# Patient Record
Sex: FEMALE | Race: WHITE | NOT HISPANIC OR LATINO | Employment: FULL TIME | URBAN - METROPOLITAN AREA
[De-identification: names, ages, dates, MRNs, and addresses within clinical notes are randomized per-mention and may not be internally consistent; named-entity substitution may affect disease eponyms.]

---

## 2019-05-30 ENCOUNTER — HOSPITAL ENCOUNTER (OUTPATIENT)
Dept: ONCOLOGY | Facility: CLINIC | Age: 54
Setting detail: INFUSION SERIES
Discharge: HOME | End: 2019-05-30
Attending: INTERNAL MEDICINE | Admitting: INTERNAL MEDICINE

## 2019-07-08 ENCOUNTER — TELEPHONE (OUTPATIENT)
Dept: ONCOLOGY | Facility: CLINIC | Age: 54
End: 2019-07-08

## 2019-07-08 NOTE — TELEPHONE ENCOUNTER
Returned patient's call regarding needing Dr. Singh's first name in order to have mammogram results forwarded.  She thought results would be available by Wednesday.

## 2019-07-10 NOTE — PROGRESS NOTES
Genetic Note    Adwoa Stacy  1965    Primary Care Physician: Anabella Singh MD  Referring Physician: Anabella Singh,*  Reason For Visit: High Risk Evaluation For breast cancer    Chief Complaint   Patient presents with   • Follow-up     Strong family history of multiple relatives with breast cancer    • Follow-up     Genetic testing results       HPI   This is a 54-year-old female who does not have any history of breast cancer, but who is here today due to strong family history of breast cancer mostly on the maternal side of the family.  Patient’s mother developed breast cancer.  She also has three sisters who also developed breast cancer.  None of them have had genetic testing.  Patient is interested in pursuing cancer genetics for her own risk assessment and management.  She has never had any breast biopsies.  She performs monthly breast self exams.  She denies any lumps, nipple discharge, skin discoloration.  She is scheduled for a mammogram in the next couple of weeks.   · 2019 patient had comprehensive gene analysis with cancer next technology.  A total of 67 genes were analyzed.  All negative for any significant mutations.  · 2019 patient had bilateral mammogram which showed heterogeneously dense breast tissue the lobe was recommended in 1 year    Past Medical History:   Diagnosis Date   • Acid reflux disease    • Constipation    • Family history of breast cancer    • History of multiple allergies    • Hypercholesterolemia    • Osteoarthritis    • Post-menopausal        Past Surgical History:   Procedure Laterality Date   • CARPAL TUNNEL RELEASE Bilateral    •  SECTION      x2   • CYSTOSCOPY W/ LASER LITHOTRIPSY           Current Outpatient Medications:   •  gabapentin (NEURONTIN) 300 MG capsule, Take 300 mg by mouth Daily., Disp: , Rfl:   •  loratadine-pseudoephedrine (CLARITIN-D 24-hour)  MG per 24 hr tablet, TAKE 1 TABLET BY MOUTH EVERY DAY, Disp: , Rfl:   •   "meloxicam (MOBIC) 15 MG tablet, Take 15 mg by mouth., Disp: , Rfl:   •  omeprazole (priLOSEC) 20 MG capsule, Take 20 mg by mouth Daily., Disp: , Rfl:   •  rosuvastatin (CRESTOR) 10 MG tablet, Take 10 mg by mouth Daily., Disp: , Rfl:   •  Calcium Carbonate-Vitamin D 500-50 MG-UNIT capsule, Take 1 tablet by mouth Daily., Disp: , Rfl:   •  Cholecalciferol (VITAMIN D) 1000 units tablet, Take 2,000 Units by mouth Daily., Disp: , Rfl:     Allergies   Allergen Reactions   • Sulfa Antibiotics Hives and Rash     CHEST PAIN, \"STABBING PAIN ALL THE WAY THROUGH MY BODY\"   • Penicillins Hives   • Trimethoprim Hallucinations     EYE SWELLED       Family History   Problem Relation Age of Onset   • Breast cancer Mother 60   • Breast cancer Maternal Aunt         ages 42, 50, and 60-3 maternal aunts   • Ovarian cancer Paternal Aunt 60   • Prostate cancer Maternal Grandfather         mid 80's       Cancer-related family history includes Breast cancer in her maternal aunt; Breast cancer (age of onset: 60) in her mother; Ovarian cancer (age of onset: 60) in her paternal aunt; Prostate cancer in her maternal grandfather.    Social History     Tobacco Use   • Smoking status: Never Smoker   Substance Use Topics   • Alcohol use: Yes     Comment: drinks alcohol occasionally    • Drug use: No       Review of Systems   Constitutional: Negative for chills and fever.   HENT: Negative for ear pain, mouth sores, nosebleeds and sore throat.    Eyes: Negative for photophobia and visual disturbance.   Respiratory: Negative for wheezing and stridor.    Cardiovascular: Negative for chest pain and palpitations.   Gastrointestinal: Negative for abdominal pain, diarrhea, nausea and vomiting.   Endocrine: Negative for cold intolerance and heat intolerance.   Genitourinary: Negative for dysuria and hematuria.   Musculoskeletal: Negative for joint swelling and neck stiffness.   Skin: Negative for color change and rash.   Neurological: Negative for seizures " "and syncope.   Hematological: Negative for adenopathy.        No obvious bleeding   Psychiatric/Behavioral: Negative for agitation, confusion and hallucinations.       Objective:    Vitals:    19 1218   BP: 124/78   Pulse: 73   Resp: 14   Temp: 97.8 °F (36.6 °C)   Weight: 70.3 kg (155 lb)   Height: 154.9 cm (61\")   PainSc: 0-No pain       (0) Fully active, able to carry on all predisease performance without restriction    Physical Exam   Constitutional: She is oriented to person, place, and time. No distress.   HENT:   Head: Normocephalic and atraumatic.   Eyes: Conjunctivae and EOM are normal. Right eye exhibits no discharge. Left eye exhibits no discharge. No scleral icterus.   Neck: Normal range of motion. Neck supple. No thyromegaly present.   Cardiovascular: Normal rate, regular rhythm and normal heart sounds. Exam reveals no gallop and no friction rub.   Pulmonary/Chest: Effort normal. No stridor. No respiratory distress. She has no wheezes.   Abdominal: Soft. Bowel sounds are normal. She exhibits no mass. There is no tenderness. There is no rebound and no guarding.   Musculoskeletal: Normal range of motion. She exhibits no tenderness.   Lymphadenopathy:     She has no cervical adenopathy.   Neurological: She is alert and oriented to person, place, and time. She exhibits normal muscle tone.   Skin: Skin is warm. No rash noted. She is not diaphoretic. No erythema.   Psychiatric: She has a normal mood and affect. Her behavior is normal.   Nursing note and vitals reviewed.      Assessment/Plan     There are no diagnoses linked to this encounter.    ASSESSMENT:    1. Strong family history of multiple relatives with breast cancer on the maternal side of the family, including her mother and three maternal aunts, one premenopausal.   2. Paternal aunt also developed ovarian cancer at the age of 60 and  from metastatic disease.  No other known cancer history on the paternal side of the " family.  3. Comprehensive gene analysis was negative for any significant mutation.  A total of 67 genes were analyzed all negative mutation.  4. Dense breast tissue  5. Tyrer Thayer risk assessment estimated at 22.5% lifetime risk for developing breast cancer... elevated    PLAN:     Patient has screened negative for any significant mutation with cancer next technology.  He understands her family members that have had cancer the best candidates to screen as the results will help to give better interpretation of her negative results.  We have reviewed patient's results.  She has screened negative for any deleterious mutation that could increase her risk for developing cancer.  I explained to patient that the cancers that occurred in her family may have all been sporadic or could still be due to a mutation that we are not able to detect with the available technology.  There are other genes suspected to increase risk breast cancer that are yet to be identified.  About 5% to 10% of all breast cancers are due to genetic mutation, the rest are due to hormonal, reproductive, endocrinology and lifestyle issues.  Patient will continue to monitor her family cancer history and update us of any changes that occur in the future.  Her negative result could imply that even if there is a mutation in the family she may not have inherited it. We discussed the concept of familial breast cancer syndrome, which could play a role in her family cancer development.     Patient has screened to be high risk for breast cancer based on the Tyrer-Cuzick risk assessment tool or the Meryl Model.    We discussed general breast health care such as increased breast awareness, monthly breast self- exams, six monthly clinical breast exam and annual mammograms.  We also discussed use of breast MRI for screening high-risk patients.    We discussed risk modifications such as limiting alcohol ingestion to one to two drinks per week, avoidance of smoking  and avoidance of postmenopausal weight gain.  We discussed breast cancer risks associated with prolonged hormone replacement therapy.    We discussed signs and symptoms for patient to watch out for and notify us should they occur including breast lumps, nipple discharge, skin discoloration, breast pain or any other concerns she may have     We discussed chemoprophylaxis with antiestrogen, which has been shown in clinical studies to reduce risk of breast cancer as much as 50% in high risk women.  We discussed the side effects associated with these medicines including hot flashes, mood changes, and cardiovascular risk including strokes, heart attacks and vascular thrombosis.  Educational materials have been given to patient to review and  she will let me know how to proceed. She may be interested in [employer].She has material to review.    A copy of her genetic results have been given to patient for her own records keeping.  I will plan to see her again in six months for her breast exam and scheduling of her breast MRI .  Patient has been given opportunities to ask questions, which have all been answered to the best of abilities.      More than 40 minutes was spent in discussing her genetic results, counseling and calculating her risk for developing cancer and advising on how to reduce her risks including behavioral modifications. Also discussed future surveillance given her risks.

## 2019-07-11 ENCOUNTER — APPOINTMENT (OUTPATIENT)
Dept: LAB | Facility: HOSPITAL | Age: 54
End: 2019-07-11

## 2019-07-11 ENCOUNTER — OFFICE VISIT (OUTPATIENT)
Dept: ONCOLOGY | Facility: CLINIC | Age: 54
End: 2019-07-11

## 2019-07-11 VITALS
BODY MASS INDEX: 29.27 KG/M2 | HEIGHT: 61 IN | TEMPERATURE: 97.8 F | DIASTOLIC BLOOD PRESSURE: 78 MMHG | HEART RATE: 73 BPM | RESPIRATION RATE: 14 BRPM | WEIGHT: 155 LBS | SYSTOLIC BLOOD PRESSURE: 124 MMHG

## 2019-07-11 DIAGNOSIS — Z80.9 FAMILY HISTORY OF CANCER: Primary | ICD-10-CM

## 2019-07-11 DIAGNOSIS — Z91.89 AT HIGH RISK FOR BREAST CANCER: ICD-10-CM

## 2019-07-11 PROCEDURE — 99215 OFFICE O/P EST HI 40 MIN: CPT | Performed by: INTERNAL MEDICINE

## 2019-07-11 PROCEDURE — G0463 HOSPITAL OUTPT CLINIC VISIT: HCPCS | Performed by: INTERNAL MEDICINE

## 2019-07-11 RX ORDER — MELOXICAM 15 MG/1
15 TABLET ORAL
COMMUNITY
Start: 2018-09-06 | End: 2019-09-06

## 2019-07-11 RX ORDER — ROSUVASTATIN CALCIUM 10 MG/1
10 TABLET, COATED ORAL DAILY
COMMUNITY
Start: 2013-12-20 | End: 2019-08-01

## 2019-07-11 RX ORDER — GABAPENTIN 300 MG/1
300 CAPSULE ORAL DAILY
COMMUNITY
Start: 2013-12-20

## 2019-07-11 RX ORDER — OMEPRAZOLE 20 MG/1
20 CAPSULE, DELAYED RELEASE ORAL DAILY
COMMUNITY
Start: 2019-06-17 | End: 2020-06-11

## 2019-12-17 ENCOUNTER — TELEPHONE (OUTPATIENT)
Dept: ONCOLOGY | Facility: CLINIC | Age: 54
End: 2019-12-17

## 2019-12-17 NOTE — TELEPHONE ENCOUNTER
Ms. Stacy left message she needed to reschedule her 1/2/20 appt.  Returned call, rescheduled for 1/20/20 per pt request.

## 2020-01-20 ENCOUNTER — OFFICE VISIT (OUTPATIENT)
Dept: ONCOLOGY | Facility: CLINIC | Age: 55
End: 2020-01-20

## 2020-01-20 ENCOUNTER — OFFICE VISIT (OUTPATIENT)
Dept: LAB | Facility: HOSPITAL | Age: 55
End: 2020-01-20

## 2020-01-20 VITALS
BODY MASS INDEX: 28.85 KG/M2 | SYSTOLIC BLOOD PRESSURE: 124 MMHG | RESPIRATION RATE: 18 BRPM | TEMPERATURE: 98.4 F | WEIGHT: 152.8 LBS | DIASTOLIC BLOOD PRESSURE: 85 MMHG | HEART RATE: 91 BPM | HEIGHT: 61 IN

## 2020-01-20 DIAGNOSIS — Z91.89 AT HIGH RISK FOR BREAST CANCER: ICD-10-CM

## 2020-01-20 DIAGNOSIS — Z80.9 FAMILY HISTORY OF CANCER: Primary | ICD-10-CM

## 2020-01-20 DIAGNOSIS — Z91.89 AT HIGH RISK FOR BREAST CANCER: Primary | ICD-10-CM

## 2020-01-20 DIAGNOSIS — Z80.9 FAMILY HISTORY OF CANCER: ICD-10-CM

## 2020-01-20 LAB
ALBUMIN SERPL-MCNC: 4.5 G/DL (ref 3.5–5.2)
ALBUMIN/GLOB SERPL: 1.5 G/DL
ALP SERPL-CCNC: 82 U/L (ref 39–117)
ALT SERPL W P-5'-P-CCNC: 13 U/L (ref 1–33)
ANION GAP SERPL CALCULATED.3IONS-SCNC: 12 MMOL/L (ref 5–15)
AST SERPL-CCNC: 21 U/L (ref 1–32)
BASOPHILS # BLD AUTO: 0.06 10*3/MM3 (ref 0–0.2)
BASOPHILS NFR BLD AUTO: 1 % (ref 0–1.5)
BILIRUB SERPL-MCNC: 0.2 MG/DL (ref 0.2–1.2)
BUN BLD-MCNC: 12 MG/DL (ref 6–20)
BUN/CREAT SERPL: 15 (ref 7–25)
CALCIUM SPEC-SCNC: 9.5 MG/DL (ref 8.6–10.5)
CHLORIDE SERPL-SCNC: 103 MMOL/L (ref 98–107)
CO2 SERPL-SCNC: 27 MMOL/L (ref 22–29)
CREAT BLD-MCNC: 0.8 MG/DL (ref 0.57–1)
DEPRECATED RDW RBC AUTO: 41.9 FL (ref 37–54)
EOSINOPHIL # BLD AUTO: 0.23 10*3/MM3 (ref 0–0.4)
EOSINOPHIL NFR BLD AUTO: 3.7 % (ref 0.3–6.2)
ERYTHROCYTE [DISTWIDTH] IN BLOOD BY AUTOMATED COUNT: 13.1 % (ref 12.3–15.4)
GFR SERPL CREATININE-BSD FRML MDRD: 74 ML/MIN/1.73
GLOBULIN UR ELPH-MCNC: 3.1 GM/DL
GLUCOSE BLD-MCNC: 104 MG/DL (ref 65–99)
HCT VFR BLD AUTO: 39.1 % (ref 34–46.6)
HGB BLD-MCNC: 13.3 G/DL (ref 12–15.9)
LYMPHOCYTES # BLD AUTO: 1.92 10*3/MM3 (ref 0.7–3.1)
LYMPHOCYTES NFR BLD AUTO: 31.2 % (ref 19.6–45.3)
MCH RBC QN AUTO: 30.4 PG (ref 26.6–33)
MCHC RBC AUTO-ENTMCNC: 34 G/DL (ref 31.5–35.7)
MCV RBC AUTO: 89.5 FL (ref 79–97)
MONOCYTES # BLD AUTO: 0.46 10*3/MM3 (ref 0.1–0.9)
MONOCYTES NFR BLD AUTO: 7.5 % (ref 5–12)
NEUTROPHILS # BLD AUTO: 3.48 10*3/MM3 (ref 1.7–7)
NEUTROPHILS NFR BLD AUTO: 56.6 % (ref 42.7–76)
PLATELET # BLD AUTO: 315 10*3/MM3 (ref 140–450)
PMV BLD AUTO: 8.9 FL (ref 6–12)
POTASSIUM BLD-SCNC: 3.8 MMOL/L (ref 3.5–5.2)
PROT SERPL-MCNC: 7.6 G/DL (ref 6–8.5)
RBC # BLD AUTO: 4.37 10*6/MM3 (ref 3.77–5.28)
SODIUM BLD-SCNC: 142 MMOL/L (ref 136–145)
WBC NRBC COR # BLD: 6.15 10*3/MM3 (ref 3.4–10.8)

## 2020-01-20 PROCEDURE — 85025 COMPLETE CBC W/AUTO DIFF WBC: CPT

## 2020-01-20 PROCEDURE — 36415 COLL VENOUS BLD VENIPUNCTURE: CPT

## 2020-01-20 PROCEDURE — 99214 OFFICE O/P EST MOD 30 MIN: CPT | Performed by: INTERNAL MEDICINE

## 2020-01-20 PROCEDURE — 80053 COMPREHEN METABOLIC PANEL: CPT | Performed by: NURSE PRACTITIONER

## 2020-01-20 RX ORDER — PLECANATIDE 3 MG/1
TABLET ORAL
COMMUNITY
Start: 2019-12-13

## 2020-01-20 RX ORDER — MELOXICAM 15 MG/1
15 TABLET ORAL
COMMUNITY
Start: 2019-07-11 | End: 2020-07-10

## 2020-01-20 RX ORDER — RALOXIFENE HYDROCHLORIDE 60 MG/1
60 TABLET, FILM COATED ORAL DAILY
Refills: 12 | COMMUNITY
Start: 2019-12-13 | End: 2021-09-30 | Stop reason: SDUPTHER

## 2020-01-20 RX ORDER — ROSUVASTATIN CALCIUM 10 MG/1
10 TABLET, COATED ORAL DAILY
Refills: 3 | COMMUNITY
Start: 2019-10-21

## 2020-01-20 NOTE — PROGRESS NOTES
Genetic Note    Adwoa Stacy  1965    Primary Care Physician: Anabella Singh MD  Referring Physician: Provider, No Known  Reason For Visit: High Risk Evaluation For breast cancer    No chief complaint on file.      HPI   This is a 54-year-old female who does not have any history of breast cancer, but who is here today due to strong family history of breast cancer mostly on the maternal side of the family.  Patient’s mother developed breast cancer.  She also has three sisters who also developed breast cancer.  None of them have had genetic testing.  Patient is interested in pursuing cancer genetics for her own risk assessment and management.  She has never had any breast biopsies.  She performs monthly breast self exams.  She denies any lumps, nipple discharge, skin discoloration.  She is scheduled for a mammogram in the next couple of weeks.   · 2019 patient had comprehensive gene analysis with cancer next technology.  A total of 67 genes were analyzed.  All negative for any significant mutations.  · 2019 patient had bilateral mammogram which showed heterogeneously dense breast tissue the lobe was recommended in 1 year    Past Medical History:   Diagnosis Date   • Acid reflux disease    • Constipation    • Family history of breast cancer    • History of multiple allergies    • Hypercholesterolemia    • Osteoarthritis    • Post-menopausal        Past Surgical History:   Procedure Laterality Date   • CARPAL TUNNEL RELEASE Bilateral    •  SECTION      x2   • CYSTOSCOPY W/ LASER LITHOTRIPSY           Current Outpatient Medications:   •  Calcium Carbonate-Vitamin D 500-50 MG-UNIT capsule, Take 1 tablet by mouth Daily., Disp: , Rfl:   •  Cholecalciferol (VITAMIN D) 1000 units tablet, Take 2,000 Units by mouth Daily., Disp: , Rfl:   •  gabapentin (NEURONTIN) 300 MG capsule, Take 300 mg by mouth Daily., Disp: , Rfl:   •  loratadine-pseudoephedrine (CLARITIN-D 24-hour)  MG per 24 hr  "tablet, TAKE 1 TABLET BY MOUTH EVERY DAY, Disp: , Rfl:   •  omeprazole (priLOSEC) 20 MG capsule, Take 20 mg by mouth Daily., Disp: , Rfl:     Allergies   Allergen Reactions   • Sulfa Antibiotics Hives and Rash     CHEST PAIN, \"STABBING PAIN ALL THE WAY THROUGH MY BODY\"   • Penicillins Hives   • Trimethoprim Hallucinations     EYE SWELLED       Family History   Problem Relation Age of Onset   • Breast cancer Mother 60   • Breast cancer Maternal Aunt         ages 42, 50, and 60-3 maternal aunts   • Ovarian cancer Paternal Aunt 60   • Prostate cancer Maternal Grandfather         mid 80's       Cancer-related family history includes Breast cancer in her maternal aunt; Breast cancer (age of onset: 60) in her mother; Ovarian cancer (age of onset: 60) in her paternal aunt; Prostate cancer in her maternal grandfather.    Social History     Tobacco Use   • Smoking status: Never Smoker   Substance Use Topics   • Alcohol use: Yes     Comment: drinks alcohol occasionally    • Drug use: No       Review of Systems   Constitutional: Negative for chills and fever.   HENT: Negative for ear pain, mouth sores, nosebleeds and sore throat.    Eyes: Negative for photophobia and visual disturbance.   Respiratory: Negative for wheezing and stridor.    Cardiovascular: Negative for chest pain and palpitations.   Gastrointestinal: Negative for abdominal pain, diarrhea, nausea and vomiting.   Endocrine: Negative for cold intolerance and heat intolerance.   Genitourinary: Negative for dysuria and hematuria.   Musculoskeletal: Negative for joint swelling and neck stiffness.   Skin: Negative for color change and rash.   Neurological: Negative for seizures and syncope.   Hematological: Negative for adenopathy.        No obvious bleeding   Psychiatric/Behavioral: Negative for agitation, confusion and hallucinations.       Objective:    There were no vitals filed for this visit.    (0) Fully active, able to carry on all predisease performance " without restriction    Physical Exam   Constitutional: She is oriented to person, place, and time. No distress.   HENT:   Head: Normocephalic and atraumatic.   Eyes: Conjunctivae and EOM are normal. Right eye exhibits no discharge. Left eye exhibits no discharge. No scleral icterus.   Neck: Normal range of motion. Neck supple. No thyromegaly present.   Cardiovascular: Normal rate, regular rhythm and normal heart sounds. Exam reveals no gallop and no friction rub.   Pulmonary/Chest: Effort normal. No stridor. No respiratory distress. She has no wheezes.   Abdominal: Soft. Bowel sounds are normal. She exhibits no mass. There is no tenderness. There is no rebound and no guarding.   Musculoskeletal: Normal range of motion. She exhibits no tenderness.   Lymphadenopathy:     She has no cervical adenopathy.   Neurological: She is alert and oriented to person, place, and time. She exhibits normal muscle tone.   Skin: Skin is warm. No rash noted. She is not diaphoretic. No erythema.   Psychiatric: She has a normal mood and affect. Her behavior is normal.   Nursing note and vitals reviewed.      Assessment/Plan     There are no diagnoses linked to this encounter.    ASSESSMENT:    1. Strong family history of multiple relatives with breast cancer on the maternal side of the family, including her mother and three maternal aunts, one premenopausal.   2. Paternal aunt also developed ovarian cancer at the age of 60 and  from metastatic disease.  No other known cancer history on the paternal side of the family.  3. Comprehensive gene analysis was negative for any significant mutation.  A total of 67 genes were analyzed all negative mutation.  4. Dense breast tissue  5. Tyrer Verónica risk assessment estimated at 22.5% lifetime risk for developing breast cancer... elevated    PLAN:     Patient has screened negative for any significant mutation with cancer next technology.  He understands her family members that have had cancer the  best candidates to screen as the results will help to give better interpretation of her negative results.  We have reviewed patient's results.  She has screened negative for any deleterious mutation that could increase her risk for developing cancer.  I explained to patient that the cancers that occurred in her family may have all been sporadic or could still be due to a mutation that we are not able to detect with the available technology.  There are other genes suspected to increase risk breast cancer that are yet to be identified.  About 5% to 10% of all breast cancers are due to genetic mutation, the rest are due to hormonal, reproductive, endocrinology and lifestyle issues.  Patient will continue to monitor her family cancer history and update us of any changes that occur in the future.  Her negative result could imply that even if there is a mutation in the family she may not have inherited it. We discussed the concept of familial breast cancer syndrome, which could play a role in her family cancer development.     Patient has screened to be high risk for breast cancer based on the Tyrer-Cuzick risk assessment tool or the Meryl Model.    We discussed general breast health care such as increased breast awareness, monthly breast self- exams, six monthly clinical breast exam and annual mammograms.  We also discussed use of breast MRI for screening high-risk patients.    We discussed risk modifications such as limiting alcohol ingestion to one to two drinks per week, avoidance of smoking and avoidance of postmenopausal weight gain.  We discussed breast cancer risks associated with prolonged hormone replacement therapy.    We discussed signs and symptoms for patient to watch out for and notify us should they occur including breast lumps, nipple discharge, skin discoloration, breast pain or any other concerns she may have     We discussed chemoprophylaxis with antiestrogen, which has been shown in clinical studies to  reduce risk of breast cancer as much as 50% in high risk women.  We discussed the side effects associated with these medicines including hot flashes, mood changes, and cardiovascular risk including strokes, heart attacks and vascular thrombosis.  Educational materials have been given to patient to review and  she will let me know how to proceed. She may be interested in [employer].She has material to review.    A copy of her genetic results have been given to patient for her own records keeping.  I will plan to see her again in six months for her breast exam and scheduling of her breast MRI .  Patient has been given opportunities to ask questions, which have all been answered to the best of abilities.      More than 40 minutes was spent in discussing her genetic results, counseling and calculating her risk for developing cancer and advising on how to reduce her risks including behavioral modifications. Also discussed future surveillance given her risks.

## 2020-01-20 NOTE — PROGRESS NOTES
Hematology/Oncology Outpatient Follow Up    PATIENT NAME:Adwoa Stacy  :1965  MRN: [id number]  PRIMARY CARE PHYSICIAN: Anabella Singh MD  REFERRING PHYSICIAN: Provider, No Known    Chief Complaint   Patient presents with   • Follow-up     family history of cabncer        HISTORY OF PRESENT ILLNESS:   This is a 54-year-old female who does not have any history of breast cancer, but who is here today due to strong family history of breast cancer mostly on the maternal side of the family.  Patient’s mother developed breast cancer.  She also has three sisters who also developed breast cancer.  None of them have had genetic testing.  Patient is interested in pursuing cancer genetics for her own risk assessment and management.  She has never had any breast biopsies.  She performs monthly breast self exams.  She denies any lumps, nipple discharge, skin discoloration.  She is scheduled for a mammogram in the next couple of weeks.   · 2019 patient had comprehensive gene analysis with cancer next technology.  A total of 67 genes were analyzed.  All negative for any significant mutations.  · 2019 patient had bilateral mammogram which showed heterogeneously dense breast tissue the lobe was recommended in 1 year  · Patient was placed on Evista for osteopenia nd breats cancer prevention    Past Medical History:   Diagnosis Date   • Acid reflux disease    • Constipation    • Family history of breast cancer    • History of multiple allergies    • Hypercholesterolemia    • Osteoarthritis    • Post-menopausal        Past Surgical History:   Procedure Laterality Date   • CARPAL TUNNEL RELEASE Bilateral    •  SECTION      x2   • CYSTOSCOPY W/ LASER LITHOTRIPSY           Current Outpatient Medications:   •  Calcium Carbonate-Vitamin D 500-50 MG-UNIT capsule, Take 1 tablet by mouth Daily., Disp: , Rfl:   •  Cholecalciferol (VITAMIN D) 1000 units tablet, Take 2,000 Units by mouth Daily., Disp: , Rfl:   •   "gabapentin (NEURONTIN) 300 MG capsule, Take 300 mg by mouth Daily., Disp: , Rfl:   •  loratadine-pseudoephedrine (CLARITIN-D 24-hour)  MG per 24 hr tablet, TAKE 1 TABLET BY MOUTH EVERY DAY, Disp: , Rfl:   •  meloxicam (MOBIC) 15 MG tablet, Take 15 mg by mouth., Disp: , Rfl:   •  omeprazole (priLOSEC) 20 MG capsule, Take 20 mg by mouth Daily., Disp: , Rfl:   •  raloxifene (EVISTA) 60 MG tablet, Take 60 mg by mouth Daily., Disp: , Rfl: 12  •  rosuvastatin (CRESTOR) 10 MG tablet, Take 10 mg by mouth Daily., Disp: , Rfl: 3  •  TRULANCE 3 MG tablet, , Disp: , Rfl:     Allergies   Allergen Reactions   • Sulfa Antibiotics Hives and Rash     CHEST PAIN, \"STABBING PAIN ALL THE WAY THROUGH MY BODY\"   • Penicillins Hives   • Trimethoprim Hallucinations     EYE SWELLED       Family History   Problem Relation Age of Onset   • Breast cancer Mother 60   • Breast cancer Maternal Aunt         ages 42, 50, and 60-3 maternal aunts   • Ovarian cancer Paternal Aunt 60   • Prostate cancer Maternal Grandfather         mid 80's       Cancer-related family history includes Breast cancer in her maternal aunt; Breast cancer (age of onset: 60) in her mother; Ovarian cancer (age of onset: 60) in her paternal aunt; Prostate cancer in her maternal grandfather.    Social History     Tobacco Use   • Smoking status: Never Smoker   • Smokeless tobacco: Never Used   Substance Use Topics   • Alcohol use: Yes     Frequency: 4 or more times a week     Drinks per session: 1 or 2     Binge frequency: Never     Comment: drinks alcohol occasionally    • Drug use: No       I have reviewed the history of present illness, past medical history, family history, social history, lab results, all notes and other records since the patient was last seen on 12/17/2019.    SUBJECTIVE:  The patient is here for a follow up appointment.   The patient states that her primary care physician placed her on Evista about 3 months ago.  The patient states that she was " "diagnosed with osteopenia.            REVIEW OF SYSTEMS:  Review of Systems   Constitutional: Negative for chills and fever.   HENT: Negative for ear pain, mouth sores, nosebleeds and sore throat.    Eyes: Negative for photophobia and visual disturbance.   Respiratory: Negative for wheezing and stridor.    Cardiovascular: Negative for chest pain and palpitations.   Gastrointestinal: Negative for abdominal pain, diarrhea, nausea and vomiting.   Endocrine: Negative for cold intolerance and heat intolerance.   Genitourinary: Negative for dysuria and hematuria.   Musculoskeletal: Negative for joint swelling and neck stiffness.   Skin: Negative for color change and rash.   Neurological: Negative for seizures and syncope.   Hematological: Negative for adenopathy.        No obvious bleeding   Psychiatric/Behavioral: Negative for agitation, confusion and hallucinations.       OBJECTIVE:    Vitals:    01/20/20 1130   BP: 124/85   Pulse: 91   Resp: 18   Temp: 98.4 °F (36.9 °C)   Weight: 69.3 kg (152 lb 12.8 oz)   Height: 154.9 cm (61\")   PainSc: 0-No pain       ECOG  (0) Fully active, able to carry on all predisease performance without restriction    Physical Exam   Constitutional: She is oriented to person, place, and time. No distress.   HENT:   Head: Normocephalic and atraumatic.   Eyes: Conjunctivae and EOM are normal. Right eye exhibits no discharge. Left eye exhibits no discharge. No scleral icterus.   Neck: Normal range of motion. Neck supple. No thyromegaly present.   Cardiovascular: Normal rate, regular rhythm and normal heart sounds. Exam reveals no gallop and no friction rub.   Pulmonary/Chest: Effort normal. No stridor. No respiratory distress. She has no wheezes.   Abdominal: Soft. Bowel sounds are normal. She exhibits no mass. There is no tenderness. There is no rebound and no guarding.   Musculoskeletal: Normal range of motion. She exhibits no tenderness.   Lymphadenopathy:     She has no cervical adenopathy. "   Neurological: She is alert and oriented to person, place, and time. She exhibits normal muscle tone.   Skin: Skin is warm. No rash noted. She is not diaphoretic. No erythema.   Psychiatric: She has a normal mood and affect. Her behavior is normal.   Nursing note and vitals reviewed.    Bilateral breast exam was normal bilateral axillary exam was also normal     RECENT LABS  WBC   Date Value Ref Range Status   01/20/2020 6.15 3.40 - 10.80 10*3/mm3 Final     RBC   Date Value Ref Range Status   01/20/2020 4.37 3.77 - 5.28 10*6/mm3 Final     Hemoglobin   Date Value Ref Range Status   01/20/2020 13.3 12.0 - 15.9 g/dL Final     Hematocrit   Date Value Ref Range Status   01/20/2020 39.1 34.0 - 46.6 % Final     MCV   Date Value Ref Range Status   01/20/2020 89.5 79.0 - 97.0 fL Final     MCH   Date Value Ref Range Status   01/20/2020 30.4 26.6 - 33.0 pg Final     MCHC   Date Value Ref Range Status   01/20/2020 34.0 31.5 - 35.7 g/dL Final     RDW   Date Value Ref Range Status   01/20/2020 13.1 12.3 - 15.4 % Final     RDW-SD   Date Value Ref Range Status   01/20/2020 41.9 37.0 - 54.0 fl Final     MPV   Date Value Ref Range Status   01/20/2020 8.9 6.0 - 12.0 fL Final     Platelets   Date Value Ref Range Status   01/20/2020 315 140 - 450 10*3/mm3 Final     Neutrophil %   Date Value Ref Range Status   01/20/2020 56.6 42.7 - 76.0 % Final     Lymphocyte %   Date Value Ref Range Status   01/20/2020 31.2 19.6 - 45.3 % Final     Monocyte %   Date Value Ref Range Status   01/20/2020 7.5 5.0 - 12.0 % Final     Eosinophil %   Date Value Ref Range Status   01/20/2020 3.7 0.3 - 6.2 % Final     Basophil %   Date Value Ref Range Status   01/20/2020 1.0 0.0 - 1.5 % Final     Neutrophils, Absolute   Date Value Ref Range Status   01/20/2020 3.48 1.70 - 7.00 10*3/mm3 Final     Lymphocytes, Absolute   Date Value Ref Range Status   01/20/2020 1.92 0.70 - 3.10 10*3/mm3 Final     Monocytes, Absolute   Date Value Ref Range Status   01/20/2020 0.46  0.10 - 0.90 10*3/mm3 Final     Eosinophils, Absolute   Date Value Ref Range Status   2020 0.23 0.00 - 0.40 10*3/mm3 Final     Basophils, Absolute   Date Value Ref Range Status   2020 0.06 0.00 - 0.20 10*3/mm3 Final       Lab Results   Component Value Date    BUN 20 2018    CREATININE 0.8 2018    BCR 25.0 2018    K 4.9 2018    CO2 29 2018    CALCIUM 9.6 2018    ALBUMIN 4.7 2018    LABIL2 1.3 2018    AST 32 2018    ALT 22 2018         Assessment/Plan     At high risk for breast cancer  - Comprehensive Metabolic Panel  - MRI Breast Bilateral With & Without Contrast      ASSESSMENT:    1. Strong family history of multiple relatives with breast cancer on the maternal side of the family, including her mother and three maternal aunts, one premenopausal.   2. Paternal aunt also developed ovarian cancer at the age of 60 and  from metastatic disease.  No other known cancer history on the paternal side of the family.  3. Comprehensive gene analysis was negative for any significant mutation.  A total of 67 genes were analyzed all negative mutation.  4. Dense breast tissue  5. [employer] [place] risk assessment estimated at 22.5% lifetime risk for developing breast cancer... elevated  6. On Evista for breast cancer prevention and postmenopausal bone disease    PLANS:     Breast MRI with and without contrast due now.  Will go ahead and order  CMP today due to starting Evista  She has been asked to continue with monthly breast self-exams and call for lumps, nipple discharge, skin discoloration or breast pain.  Continue Evista with Oscal D BID  Follow up in 6 months             I have reviewed labs results, imaging, vitals, and medications with the patient today. Will follow up in 6 months with me.    Patient verbalized understanding and is in agreement of the above plan.    Part of this document was scribed by Shaunna Mae, RN, BSN.

## 2020-02-14 ENCOUNTER — HOSPITAL ENCOUNTER (OUTPATIENT)
Dept: MRI IMAGING | Facility: HOSPITAL | Age: 55
Discharge: HOME | End: 2020-02-14
Admitting: NURSE PRACTITIONER

## 2020-02-14 DIAGNOSIS — Z91.89 AT HIGH RISK FOR BREAST CANCER: ICD-10-CM

## 2020-02-14 PROCEDURE — A9579 GAD-BASE MR CONTRAST NOS,1ML: HCPCS | Performed by: NURSE PRACTITIONER

## 2020-02-14 PROCEDURE — 77049 MRI BREAST C-+ W/CAD BI: CPT

## 2020-02-14 PROCEDURE — 25010000002 GADOTERIDOL PER 1 ML: Performed by: NURSE PRACTITIONER

## 2020-02-14 RX ADMIN — GADOTERIDOL 20 ML: 279.3 INJECTION, SOLUTION INTRAVENOUS at 09:40

## 2020-07-16 NOTE — PROGRESS NOTES
Hematology/Oncology Outpatient Follow Up    PATIENT NAME:Adwoa Stacy  :1965  MRN: [id number]  PRIMARY CARE PHYSICIAN: Anabella Singh MD  REFERRING PHYSICIAN: Anabella Singh,*    Chief Complaint   Patient presents with   • Follow-up     at high risk for breast cancer        HISTORY OF PRESENT ILLNESS:   This is a 54-year-old female who does not have any history of breast cancer, but who is here today due to strong family history of breast cancer mostly on the maternal side of the family.  Patient’s mother developed breast cancer.  She also has three sisters who also developed breast cancer.  None of them have had genetic testing.  Patient is interested in pursuing cancer genetics for her own risk assessment and management.  She has never had any breast biopsies.  She performs monthly breast self exams.  She denies any lumps, nipple discharge, skin discoloration.  She is scheduled for a mammogram in the next couple of weeks.   · 2019 patient had comprehensive gene analysis with cancer next technology.  A total of 67 genes were analyzed.  All negative for any significant mutations.  · 2019 patient had bilateral mammogram which showed heterogeneously dense breast tissue the lobe was recommended in 1 year  · Patient was placed on Evista for osteopenia  and breats cancer prevention  · 2020: Patient had bilateral breast MRI which was normal    Past Medical History:   Diagnosis Date   • Acid reflux disease    • Constipation    • Family history of breast cancer    • History of multiple allergies    • Hypercholesterolemia    • Osteoarthritis    • Post-menopausal        Past Surgical History:   Procedure Laterality Date   • CARPAL TUNNEL RELEASE Bilateral    •  SECTION      x2   • CYSTOSCOPY W/ LASER LITHOTRIPSY           Current Outpatient Medications:   •  Calcium Carbonate-Vitamin D 500-50 MG-UNIT capsule, Take 1 tablet by mouth Daily., Disp: , Rfl:   •  Cholecalciferol  "(VITAMIN D) 1000 units tablet, Take 2,000 Units by mouth Daily., Disp: , Rfl:   •  gabapentin (NEURONTIN) 300 MG capsule, Take 300 mg by mouth Daily., Disp: , Rfl:   •  raloxifene (EVISTA) 60 MG tablet, Take 60 mg by mouth Daily., Disp: , Rfl: 12  •  rosuvastatin (CRESTOR) 10 MG tablet, Take 10 mg by mouth Daily., Disp: , Rfl: 3  •  TRULANCE 3 MG tablet, , Disp: , Rfl:   •  loratadine-pseudoephedrine (CLARITIN-D 24-hour)  MG per 24 hr tablet, TAKE 1 TABLET BY MOUTH EVERY DAY, Disp: , Rfl:     Allergies   Allergen Reactions   • Sulfa Antibiotics Hives and Rash     CHEST PAIN, \"STABBING PAIN ALL THE WAY THROUGH MY BODY\"   • Penicillins Hives   • Trimethoprim Hallucinations     EYE SWELLED       Family History   Problem Relation Age of Onset   • Breast cancer Mother 60   • Breast cancer Maternal Aunt         ages 42, 50, and 60-3 maternal aunts   • Ovarian cancer Paternal Aunt 60   • Prostate cancer Maternal Grandfather         mid 80's       Cancer-related family history includes Breast cancer in her maternal aunt; Breast cancer (age of onset: 60) in her mother; Ovarian cancer (age of onset: 60) in her paternal aunt; Prostate cancer in her maternal grandfather.    Social History     Tobacco Use   • Smoking status: Never Smoker   • Smokeless tobacco: Never Used   Substance Use Topics   • Alcohol use: Yes     Frequency: 4 or more times a week     Drinks per session: 1 or 2     Binge frequency: Never     Comment: drinks alcohol occasionally    • Drug use: No       I have reviewed and confirmed the accuracy of the patient's history: Chief complaint, HPI and ROS as entered by the MA/LPN/RN. Anabella Singh MD 07/20/20     SUBJECTIVE:    The patient is here for a follow up appointment.  Denies any lumps, nipple discharge or skin discoloration.  She remains on Evista daily.             REVIEW OF SYSTEMS:  Review of Systems   Constitutional: Negative for chills and fever.   HENT: Negative for ear pain, mouth " "sores, nosebleeds and sore throat.    Eyes: Negative for photophobia and visual disturbance.   Respiratory: Negative for wheezing and stridor.    Cardiovascular: Negative for chest pain and palpitations.   Gastrointestinal: Negative for abdominal pain, diarrhea, nausea and vomiting.   Endocrine: Negative for cold intolerance and heat intolerance.   Genitourinary: Negative for dysuria and hematuria.   Musculoskeletal: Negative for joint swelling and neck stiffness.   Skin: Negative for color change and rash.   Neurological: Negative for seizures and syncope.   Hematological: Negative for adenopathy.        No obvious bleeding   Psychiatric/Behavioral: Negative for agitation, confusion and hallucinations.     ROS as documented    OBJECTIVE:    Vitals:    07/20/20 1001   BP: 138/87   Pulse: 92   Resp: 18   Temp: 96.9 °F (36.1 °C)   TempSrc: Oral   Weight: 71.2 kg (157 lb)   Height: 152.4 cm (60\")   PainSc: 0-No pain       ECOG  (0) Fully active, able to carry on all predisease performance without restriction    Physical Exam   Constitutional: She is oriented to person, place, and time. No distress.   HENT:   Head: Normocephalic and atraumatic.   Eyes: Conjunctivae and EOM are normal. Right eye exhibits no discharge. Left eye exhibits no discharge. No scleral icterus.   Neck: Normal range of motion. Neck supple. No thyromegaly present.   Cardiovascular: Normal rate, regular rhythm and normal heart sounds. Exam reveals no gallop and no friction rub.   Pulmonary/Chest: Effort normal. No stridor. No respiratory distress. She has no wheezes.   Abdominal: Soft. Bowel sounds are normal. She exhibits no mass. There is no tenderness. There is no rebound and no guarding.   Musculoskeletal: Normal range of motion. She exhibits no tenderness.   Lymphadenopathy:     She has no cervical adenopathy.   Neurological: She is alert and oriented to person, place, and time. She exhibits normal muscle tone.   Skin: Skin is warm. No rash " noted. She is not diaphoretic. No erythema.   Psychiatric: She has a normal mood and affect. Her behavior is normal.   Nursing note and vitals reviewed.    Bilateral breast exam was normal bilateral axillary exam was also normal     I have reexamined the patient and the results are consistent with the previously documented exam. Anabella Singh MD     RECENT LABS  WBC   Date Value Ref Range Status   07/20/2020 7.53 3.40 - 10.80 10*3/mm3 Final     RBC   Date Value Ref Range Status   07/20/2020 4.44 3.77 - 5.28 10*6/mm3 Final     Hemoglobin   Date Value Ref Range Status   07/20/2020 13.5 12.0 - 15.9 g/dL Final     Hematocrit   Date Value Ref Range Status   07/20/2020 39.6 34.0 - 46.6 % Final     MCV   Date Value Ref Range Status   07/20/2020 89.2 79.0 - 97.0 fL Final     MCH   Date Value Ref Range Status   07/20/2020 30.4 26.6 - 33.0 pg Final     MCHC   Date Value Ref Range Status   07/20/2020 34.1 31.5 - 35.7 g/dL Final     RDW   Date Value Ref Range Status   07/20/2020 13.1 12.3 - 15.4 % Final     RDW-SD   Date Value Ref Range Status   07/20/2020 41.8 37.0 - 54.0 fl Final     MPV   Date Value Ref Range Status   07/20/2020 8.9 6.0 - 12.0 fL Final     Platelets   Date Value Ref Range Status   07/20/2020 307 140 - 450 10*3/mm3 Final     Neutrophil %   Date Value Ref Range Status   07/20/2020 62.2 42.7 - 76.0 % Final     Lymphocyte %   Date Value Ref Range Status   07/20/2020 27.5 19.6 - 45.3 % Final     Monocyte %   Date Value Ref Range Status   07/20/2020 6.5 5.0 - 12.0 % Final     Eosinophil %   Date Value Ref Range Status   07/20/2020 3.1 0.3 - 6.2 % Final     Basophil %   Date Value Ref Range Status   07/20/2020 0.7 0.0 - 1.5 % Final     Neutrophils, Absolute   Date Value Ref Range Status   07/20/2020 4.69 1.70 - 7.00 10*3/mm3 Final     Lymphocytes, Absolute   Date Value Ref Range Status   07/20/2020 2.07 0.70 - 3.10 10*3/mm3 Final     Monocytes, Absolute   Date Value Ref Range Status   07/20/2020 0.49 0.10  - 0.90 10*3/mm3 Final     Eosinophils, Absolute   Date Value Ref Range Status   2020 0.23 0.00 - 0.40 10*3/mm3 Final     Basophils, Absolute   Date Value Ref Range Status   2020 0.05 0.00 - 0.20 10*3/mm3 Final       Lab Results   Component Value Date    GLUCOSE 104 (H) 2020    BUN 12 2020    CREATININE 0.80 2020    EGFRIFNONA 74 2020    BCR 15.0 2020    K 3.8 2020    CO2 27.0 2020    CALCIUM 9.5 2020    ALBUMIN 4.50 2020    LABIL2 1.3 2018    AST 21 2020    ALT 13 2020         Assessment/Plan     Family history of cancer  - CBC & Differential  - Comprehensive Metabolic Panel    At high risk for breast cancer  - CBC & Differential  - Comprehensive Metabolic Panel      ASSESSMENT:    1. Strong family history of multiple relatives with breast cancer on the maternal side of the family, including her mother and three maternal aunts, one premenopausal.  Ongoing management for high risk  2. Paternal aunt also developed ovarian cancer at the age of 60 and  from metastatic disease.  No other known cancer history on the paternal side of the family.  3. Comprehensive gene analysis was negative for any significant mutation.  A total of 67 genes were analyzed all negative mutation.  4. Dense breast tissue  5. 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 elevated: Ongoing management  6. On Evista for breast cancer prevention and postmenopausal bone disease: Ongoing  7. Monitoring for drug toxicities    PLANS:     Breast MRI with and without contrast  Will be due 21: Patient not sure that she will have this done due to cost and lack of good insurance coverage  We will schedule bilateral screening mammogram due 2020  CMP today due to starting Evista  She has been asked to continue with monthly breast self-exams and call for lumps, nipple discharge, skin discoloration or breast  pain.  Continue Evista with Oscal D BID  Follow up in 6 months             I have reviewed labs results, imaging, vitals, and medications with the patient today. Will follow up in 6 months with me.    Patient verbalized understanding and is in agreement of the above plan.

## 2020-07-20 ENCOUNTER — OFFICE VISIT (OUTPATIENT)
Dept: ONCOLOGY | Facility: CLINIC | Age: 55
End: 2020-07-20

## 2020-07-20 ENCOUNTER — LAB (OUTPATIENT)
Dept: LAB | Facility: HOSPITAL | Age: 55
End: 2020-07-20

## 2020-07-20 VITALS
SYSTOLIC BLOOD PRESSURE: 138 MMHG | DIASTOLIC BLOOD PRESSURE: 87 MMHG | RESPIRATION RATE: 18 BRPM | BODY MASS INDEX: 30.82 KG/M2 | HEART RATE: 92 BPM | TEMPERATURE: 96.9 F | WEIGHT: 157 LBS | HEIGHT: 60 IN

## 2020-07-20 DIAGNOSIS — Z80.9 FAMILY HISTORY OF CANCER: Primary | ICD-10-CM

## 2020-07-20 DIAGNOSIS — Z91.89 AT HIGH RISK FOR BREAST CANCER: ICD-10-CM

## 2020-07-20 DIAGNOSIS — Z80.9 FAMILY HISTORY OF CANCER: ICD-10-CM

## 2020-07-20 LAB
ALBUMIN SERPL-MCNC: 4.4 G/DL (ref 3.5–5.2)
ALBUMIN/GLOB SERPL: 1.6 G/DL
ALP SERPL-CCNC: 85 U/L (ref 39–117)
ALT SERPL W P-5'-P-CCNC: 18 U/L (ref 1–33)
ANION GAP SERPL CALCULATED.3IONS-SCNC: 13 MMOL/L (ref 5–15)
AST SERPL-CCNC: 19 U/L (ref 1–32)
BASOPHILS # BLD AUTO: 0.05 10*3/MM3 (ref 0–0.2)
BASOPHILS NFR BLD AUTO: 0.7 % (ref 0–1.5)
BILIRUB SERPL-MCNC: 0.4 MG/DL (ref 0–1.2)
BUN SERPL-MCNC: 18 MG/DL (ref 6–20)
BUN SERPL-MCNC: ABNORMAL MG/DL
BUN/CREAT SERPL: ABNORMAL
CALCIUM SPEC-SCNC: 9.6 MG/DL (ref 8.6–10.5)
CHLORIDE SERPL-SCNC: 102 MMOL/L (ref 98–107)
CO2 SERPL-SCNC: 26 MMOL/L (ref 22–29)
CREAT SERPL-MCNC: 0.88 MG/DL (ref 0.57–1)
DEPRECATED RDW RBC AUTO: 41.8 FL (ref 37–54)
EOSINOPHIL # BLD AUTO: 0.23 10*3/MM3 (ref 0–0.4)
EOSINOPHIL NFR BLD AUTO: 3.1 % (ref 0.3–6.2)
ERYTHROCYTE [DISTWIDTH] IN BLOOD BY AUTOMATED COUNT: 13.1 % (ref 12.3–15.4)
GFR SERPL CREATININE-BSD FRML MDRD: 66 ML/MIN/1.73
GLOBULIN UR ELPH-MCNC: 2.8 GM/DL
GLUCOSE SERPL-MCNC: 105 MG/DL (ref 65–99)
HCT VFR BLD AUTO: 39.6 % (ref 34–46.6)
HGB BLD-MCNC: 13.5 G/DL (ref 12–15.9)
LYMPHOCYTES # BLD AUTO: 2.07 10*3/MM3 (ref 0.7–3.1)
LYMPHOCYTES NFR BLD AUTO: 27.5 % (ref 19.6–45.3)
MCH RBC QN AUTO: 30.4 PG (ref 26.6–33)
MCHC RBC AUTO-ENTMCNC: 34.1 G/DL (ref 31.5–35.7)
MCV RBC AUTO: 89.2 FL (ref 79–97)
MONOCYTES # BLD AUTO: 0.49 10*3/MM3 (ref 0.1–0.9)
MONOCYTES NFR BLD AUTO: 6.5 % (ref 5–12)
NEUTROPHILS NFR BLD AUTO: 4.69 10*3/MM3 (ref 1.7–7)
NEUTROPHILS NFR BLD AUTO: 62.2 % (ref 42.7–76)
PLATELET # BLD AUTO: 307 10*3/MM3 (ref 140–450)
PMV BLD AUTO: 8.9 FL (ref 6–12)
POTASSIUM SERPL-SCNC: 3.9 MMOL/L (ref 3.5–5.2)
PROT SERPL-MCNC: 7.2 G/DL (ref 6–8.5)
RBC # BLD AUTO: 4.44 10*6/MM3 (ref 3.77–5.28)
SODIUM SERPL-SCNC: 141 MMOL/L (ref 136–145)
WBC # BLD AUTO: 7.53 10*3/MM3 (ref 3.4–10.8)

## 2020-07-20 PROCEDURE — 36415 COLL VENOUS BLD VENIPUNCTURE: CPT

## 2020-07-20 PROCEDURE — 99214 OFFICE O/P EST MOD 30 MIN: CPT | Performed by: INTERNAL MEDICINE

## 2020-07-20 PROCEDURE — 80053 COMPREHEN METABOLIC PANEL: CPT

## 2020-07-20 PROCEDURE — 85025 COMPLETE CBC W/AUTO DIFF WBC: CPT

## 2021-01-12 ENCOUNTER — TELEPHONE (OUTPATIENT)
Dept: ONCOLOGY | Facility: CLINIC | Age: 56
End: 2021-01-12

## 2021-01-12 NOTE — TELEPHONE ENCOUNTER
Returned pt's call about needing to reschedule appointment. I offered for the pt to have a video visit, but she said that her mother recently  from COVID and it would be best to reschedule. Appointment rescheduled for 21 at 9:15.

## 2021-01-12 NOTE — TELEPHONE ENCOUNTER
Patient has 6 month follow up appt 1/18.  She was exposed to COVID-19 on 1/6.  She believes she will need to r/s to February.    [phone]

## 2021-01-13 ENCOUNTER — TELEPHONE (OUTPATIENT)
Dept: ONCOLOGY | Facility: CLINIC | Age: 56
End: 2021-01-13

## 2021-01-13 DIAGNOSIS — C50.911 MALIGNANT NEOPLASM OF RIGHT FEMALE BREAST, UNSPECIFIED ESTROGEN RECEPTOR STATUS, UNSPECIFIED SITE OF BREAST (HCC): Primary | ICD-10-CM

## 2021-01-13 DIAGNOSIS — C50.912 MALIGNANT NEOPLASM OF LEFT FEMALE BREAST, UNSPECIFIED ESTROGEN RECEPTOR STATUS, UNSPECIFIED SITE OF BREAST (HCC): ICD-10-CM

## 2021-01-13 NOTE — TELEPHONE ENCOUNTER
Mammo scheduled, at [facility], at 5:30 with arrival time of 5:15PM.  Order faxed.  Patient notified and v/u.

## 2021-01-18 ENCOUNTER — APPOINTMENT (OUTPATIENT)
Dept: LAB | Facility: HOSPITAL | Age: 56
End: 2021-01-18

## 2021-02-11 ENCOUNTER — APPOINTMENT (OUTPATIENT)
Dept: LAB | Facility: HOSPITAL | Age: 56
End: 2021-02-11

## 2021-03-19 ENCOUNTER — TELEPHONE (OUTPATIENT)
Dept: ONCOLOGY | Facility: CLINIC | Age: 56
End: 2021-03-19

## 2021-03-19 NOTE — TELEPHONE ENCOUNTER
Caller: HENNY NORTON    Relationship to patient: SELF     Best call back number: [phone]    Patient is needing: TO RESCHEDULE 3- F/U AND LAB APPTS.

## 2021-03-24 ENCOUNTER — BULK ORDERING (OUTPATIENT)
Dept: CASE MANAGEMENT | Facility: OTHER | Age: 56
End: 2021-03-24

## 2021-03-24 DIAGNOSIS — Z23 IMMUNIZATION DUE: ICD-10-CM

## 2021-03-25 ENCOUNTER — APPOINTMENT (OUTPATIENT)
Dept: LAB | Facility: HOSPITAL | Age: 56
End: 2021-03-25

## 2021-03-29 PROBLEM — R51.9 CEPHALALGIA: Status: ACTIVE | Noted: 2021-03-29

## 2021-03-29 PROBLEM — M54.2 CERVICAL PAIN: Status: ACTIVE | Noted: 2021-03-29

## 2021-03-29 PROBLEM — M50.30 DDD (DEGENERATIVE DISC DISEASE), CERVICAL: Status: ACTIVE | Noted: 2021-03-29

## 2021-03-29 RX ORDER — OMEPRAZOLE 20 MG/1
20 CAPSULE, DELAYED RELEASE ORAL DAILY
COMMUNITY
Start: 2020-10-09 | End: 2021-10-04

## 2021-03-29 RX ORDER — ESCITALOPRAM OXALATE 10 MG/1
10 TABLET ORAL DAILY
COMMUNITY
Start: 2021-03-08

## 2021-03-29 RX ORDER — TRAZODONE HYDROCHLORIDE 100 MG/1
TABLET ORAL
COMMUNITY
Start: 2021-02-08

## 2021-03-29 RX ORDER — VENLAFAXINE HYDROCHLORIDE 75 MG/1
CAPSULE, EXTENDED RELEASE ORAL
COMMUNITY
Start: 2021-02-08

## 2021-03-29 RX ORDER — FAMOTIDINE 20 MG/1
TABLET, FILM COATED ORAL
COMMUNITY
Start: 2021-02-08

## 2021-03-29 RX ORDER — HYDROXYZINE PAMOATE 25 MG/1
CAPSULE ORAL
COMMUNITY
Start: 2021-02-05

## 2021-03-29 RX ORDER — MELOXICAM 15 MG/1
15 TABLET ORAL
COMMUNITY
Start: 2020-10-09 | End: 2021-10-09

## 2021-03-29 NOTE — PROGRESS NOTES
Hematology/Oncology Outpatient Follow Up    PATIENT NAME:Adwoa Stacy  :1965  MRN: [id number]  PRIMARY CARE PHYSICIAN: Citlali Murphy APRN  REFERRING PHYSICIAN: Provider, No Known    Chief Complaint   Patient presents with   • Appointment     Malignant neoplasm of right female breast, unspecified estrogen receptor status, unspecified site of breast (CMS/HCC)  Malignant neoplasm of left female breast, unspecified estrogen receptor status, unspecified site of breast (CMS/HCC)   • Follow-up        HISTORY OF PRESENT ILLNESS:   This is a 54-year-old female who does not have any history of breast cancer, but who is here today due to strong family history of breast cancer mostly on the maternal side of the family.  Patient’s mother developed breast cancer.  She also has three sisters who also developed breast cancer.  None of them have had genetic testing.  Patient is interested in pursuing cancer genetics for her own risk assessment and management.  She has never had any breast biopsies.  She performs monthly breast self exams.  She denies any lumps, nipple discharge, skin discoloration.  She is scheduled for a mammogram in the next couple of weeks.   · 2019 patient had comprehensive gene analysis with cancer next technology.  A total of 67 genes were analyzed.  All negative for any significant mutations.  · 2019 patient had bilateral mammogram which showed heterogeneously dense breast tissue the lobe was recommended in 1 year  · Patient was placed on Evista for osteopenia  and breats cancer prevention   · 2020: Patient had bilateral breast MRI which was normal    Past Medical History:   Diagnosis Date   • Acid reflux disease    • Constipation    • Family history of breast cancer    • History of multiple allergies    • Hypercholesterolemia    • Osteoarthritis    • Post-menopausal        Past Surgical History:   Procedure Laterality Date   • CARPAL TUNNEL RELEASE Bilateral    •   "SECTION      x2   • CYSTOSCOPY W/ LASER LITHOTRIPSY           Current Outpatient Medications:   •  Calcium Carbonate-Vitamin D 500-50 MG-UNIT capsule, Take 1 tablet by mouth Daily., Disp: , Rfl:   •  Cholecalciferol (VITAMIN D) 1000 units tablet, Take 2,000 Units by mouth Daily., Disp: , Rfl:   •  escitalopram (LEXAPRO) 10 MG tablet, Take 10 mg by mouth Daily., Disp: , Rfl:   •  famotidine (PEPCID) 20 MG tablet, TAKE 1 TABLET BY MOUTH TWICE A DAY AS NEEDED FOR HEARTBURN, Disp: , Rfl:   •  gabapentin (NEURONTIN) 300 MG capsule, Take 300 mg by mouth Daily., Disp: , Rfl:   •  hydrOXYzine pamoate (VISTARIL) 25 MG capsule, TAKE 1 TO 2 CAPSULES BY MOUTH AT BEDTIME AS NEEDED, Disp: , Rfl:   •  loratadine-pseudoephedrine (CLARITIN-D 24-hour)  MG per 24 hr tablet, TAKE 1 TABLET BY MOUTH EVERY DAY, Disp: , Rfl:   •  meloxicam (MOBIC) 15 MG tablet, Take 15 mg by mouth., Disp: , Rfl:   •  omeprazole (priLOSEC) 20 MG capsule, Take 20 mg by mouth Daily., Disp: , Rfl:   •  raloxifene (EVISTA) 60 MG tablet, Take 60 mg by mouth Daily., Disp: , Rfl: 12  •  rosuvastatin (CRESTOR) 10 MG tablet, Take 10 mg by mouth Daily., Disp: , Rfl: 3  •  traZODone (DESYREL) 100 MG tablet, TAKE 1/2 TO 1 TABLET BY MOUTH NIGHTLY, Disp: , Rfl:   •  TRULANCE 3 MG tablet, , Disp: , Rfl:   •  venlafaxine XR (EFFEXOR-XR) 75 MG 24 hr capsule, TAKE 1 CAPSULE BY MOUTH DAILY  DAYS., Disp: , Rfl:     Allergies   Allergen Reactions   • Sulfa Antibiotics Hives and Rash     CHEST PAIN, \"STABBING PAIN ALL THE WAY THROUGH MY BODY\"   • Penicillins Hives   • Trimethoprim Hallucinations     EYE SWELLED   • Latex Hives     On exam skin turned red       Family History   Problem Relation Age of Onset   • Breast cancer Mother 60   • Breast cancer Maternal Aunt         ages 42, 50, and 60-3 maternal aunts   • Ovarian cancer Paternal Aunt 60   • Prostate cancer Maternal Grandfather         mid 80's       Cancer-related family history includes Breast cancer in her " "maternal aunt; Breast cancer (age of onset: 60) in her mother; Ovarian cancer (age of onset: 60) in her paternal aunt; Prostate cancer in her maternal grandfather.    Social History     Tobacco Use   • Smoking status: Never Smoker   • Smokeless tobacco: Never Used   Substance Use Topics   • Alcohol use: Yes     Comment: drinks alcohol occasionally    • Drug use: No       I have reviewed and confirmed the accuracy of the patient's history: Chief complaint, HPI and ROS as entered by the MA/LPN/RN. Anabella Singh MD 03/30/21     SUBJECTIVE:    The patient is here for a follow up appointment.  Denies any lumps, nipple discharge or skin discoloration.  She remains on Evista daily.             REVIEW OF SYSTEMS:  Review of Systems   Constitutional: Negative for chills and fever.   HENT: Negative for ear pain, mouth sores, nosebleeds and sore throat.    Eyes: Negative for photophobia and visual disturbance.   Respiratory: Negative for wheezing and stridor.    Cardiovascular: Negative for chest pain and palpitations.   Gastrointestinal: Negative for abdominal pain, diarrhea, nausea and vomiting.   Endocrine: Negative for cold intolerance and heat intolerance.   Genitourinary: Negative for dysuria and hematuria.   Musculoskeletal: Negative for joint swelling and neck stiffness.   Skin: Negative for color change and rash.   Neurological: Negative for seizures and syncope.   Hematological: Negative for adenopathy.        No obvious bleeding   Psychiatric/Behavioral: Negative for agitation, confusion and hallucinations.     ROS as documented    OBJECTIVE:    Vitals:    03/30/21 1154   BP: 125/82   Pulse: 77   Resp: 18   Temp: 97.8 °F (36.6 °C)   Weight: 70.6 kg (155 lb 9.6 oz)   Height: 152.4 cm (60\")   PainSc:   4   PainLoc: Generalized  Comment: left hip and legs       ECOG  (0) Fully active, able to carry on all predisease performance without restriction    Physical Exam   Constitutional: She is oriented to person, " place, and time. No distress.   HENT:   Head: Normocephalic and atraumatic.   Eyes: Conjunctivae are normal. Right eye exhibits no discharge. Left eye exhibits no discharge. No scleral icterus.   Neck: No thyromegaly present.   Cardiovascular: Normal rate, regular rhythm and normal heart sounds. Exam reveals no gallop and no friction rub.   Pulmonary/Chest: Effort normal. No stridor. No respiratory distress. She has no wheezes.   Abdominal: Soft. Bowel sounds are normal. She exhibits no mass. There is no abdominal tenderness. There is no rebound and no guarding.   Musculoskeletal: Normal range of motion. No tenderness.   Lymphadenopathy:     She has no cervical adenopathy.   Neurological: She is alert and oriented to person, place, and time. She exhibits normal muscle tone.   Skin: Skin is warm. No rash noted. She is not diaphoretic. No erythema.   Psychiatric: Her behavior is normal.   Nursing note and vitals reviewed.    Bilateral breast exam was normal bilateral axillary exam was also normal     I have reexamined the patient and the results are consistent with the previously documented exam. Anabella Gloria Singh MD     RECENT LABS  WBC   Date Value Ref Range Status   03/30/2021 6.52 3.40 - 10.80 10*3/mm3 Final     RBC   Date Value Ref Range Status   03/30/2021 4.27 3.77 - 5.28 10*6/mm3 Final     Hemoglobin   Date Value Ref Range Status   03/30/2021 12.4 12.0 - 15.9 g/dL Final     Hematocrit   Date Value Ref Range Status   03/30/2021 38.1 34.0 - 46.6 % Final     MCV   Date Value Ref Range Status   03/30/2021 89.2 79.0 - 97.0 fL Final     MCH   Date Value Ref Range Status   03/30/2021 29.0 26.6 - 33.0 pg Final     MCHC   Date Value Ref Range Status   03/30/2021 32.5 31.5 - 35.7 g/dL Final     RDW   Date Value Ref Range Status   03/30/2021 14.1 12.3 - 15.4 % Final     RDW-SD   Date Value Ref Range Status   03/30/2021 44.8 37.0 - 54.0 fl Final     MPV   Date Value Ref Range Status   03/30/2021 8.8 6.0 - 12.0 fL  Final     Platelets   Date Value Ref Range Status   03/30/2021 296 140 - 450 10*3/mm3 Final     Neutrophil %   Date Value Ref Range Status   03/30/2021 60.9 42.7 - 76.0 % Final     Lymphocyte %   Date Value Ref Range Status   03/30/2021 27.9 19.6 - 45.3 % Final     Monocyte %   Date Value Ref Range Status   03/30/2021 6.9 5.0 - 12.0 % Final     Eosinophil %   Date Value Ref Range Status   03/30/2021 3.7 0.3 - 6.2 % Final     Basophil %   Date Value Ref Range Status   03/30/2021 0.6 0.0 - 1.5 % Final     Neutrophils, Absolute   Date Value Ref Range Status   03/30/2021 3.97 1.70 - 7.00 10*3/mm3 Final     Lymphocytes, Absolute   Date Value Ref Range Status   03/30/2021 1.82 0.70 - 3.10 10*3/mm3 Final     Monocytes, Absolute   Date Value Ref Range Status   03/30/2021 0.45 0.10 - 0.90 10*3/mm3 Final     Eosinophils, Absolute   Date Value Ref Range Status   03/30/2021 0.24 0.00 - 0.40 10*3/mm3 Final     Basophils, Absolute   Date Value Ref Range Status   03/30/2021 0.04 0.00 - 0.20 10*3/mm3 Final       Lab Results   Component Value Date    GLUCOSE 105 (H) 03/30/2021    BUN 14 03/30/2021    CREATININE 0.79 03/30/2021    EGFRIFNONA 75 03/30/2021    BCR 17.7 03/30/2021    K 3.6 03/30/2021    CO2 25.0 03/30/2021    CALCIUM 9.4 03/30/2021    ALBUMIN 4.20 03/30/2021    LABIL2 1.5 09/21/2020    AST 13 03/30/2021    ALT 11 03/30/2021         Assessment/Plan     Malignant neoplasm of right female breast, unspecified estrogen receptor status, unspecified site of breast (CMS/HCC)  - CBC & Differential    Malignant neoplasm of left female breast, unspecified estrogen receptor status, unspecified site of breast (CMS/HCC)  - CBC & Differential    Post-menopausal  - DEXA Bone Density Axial      ASSESSMENT:    1. Strong family history of multiple relatives with breast cancer on the maternal side of the family, including her mother and three maternal aunts, one premenopausal.  Ongoing management for high risk  2. Paternal aunt also  developed ovarian cancer at the age of 60 and  from metastatic disease.  No other known cancer history on the paternal side of the family.  3. Comprehensive gene analysis was negative for any significant mutation.  A total of 67 genes were analyzed all negative mutation.  4. Dense breast tissue  5. Tyrer Verónica risk assessment estimated at 22.5% lifetime risk for developing breast cancer... elevated: Ongoing management  6. On Evista for breast cancer prevention and postmenopausal bone disease: Ongoing  7. Monitoring for drug toxicities    PLANS:     Breast MRI with and without contrast  Will be due 21: Patient not sure that she will have this done due to cost and lack of good insurance coverage  Bilateral screening mammogram with 3D was done on 2021 at [facility]: This was normal. Next mammogram will be due 2022  Continue Evista 10 mg p.o. daily  Schedule DEXA scan due now  CMP today  She has been asked to continue with monthly breast self-exams and call for lumps, nipple discharge, skin discoloration or breast pain.  Continue Evista with [place]scal D BID  Follow up in 6 months             I have reviewed labs results, imaging, vitals, and medications with the patient today. Will follow up in 6 months with me.    Patient verbalized understanding and is in agreement of the above plan.

## 2021-03-30 ENCOUNTER — OFFICE VISIT (OUTPATIENT)
Dept: ONCOLOGY | Facility: CLINIC | Age: 56
End: 2021-03-30

## 2021-03-30 ENCOUNTER — LAB (OUTPATIENT)
Dept: LAB | Facility: HOSPITAL | Age: 56
End: 2021-03-30

## 2021-03-30 VITALS
SYSTOLIC BLOOD PRESSURE: 125 MMHG | HEART RATE: 77 BPM | RESPIRATION RATE: 18 BRPM | HEIGHT: 60 IN | DIASTOLIC BLOOD PRESSURE: 82 MMHG | BODY MASS INDEX: 30.55 KG/M2 | TEMPERATURE: 97.8 F | WEIGHT: 155.6 LBS

## 2021-03-30 DIAGNOSIS — Z91.89 AT HIGH RISK FOR BREAST CANCER: ICD-10-CM

## 2021-03-30 DIAGNOSIS — C50.911 MALIGNANT NEOPLASM OF RIGHT FEMALE BREAST, UNSPECIFIED ESTROGEN RECEPTOR STATUS, UNSPECIFIED SITE OF BREAST (HCC): ICD-10-CM

## 2021-03-30 DIAGNOSIS — C50.912 MALIGNANT NEOPLASM OF LEFT FEMALE BREAST, UNSPECIFIED ESTROGEN RECEPTOR STATUS, UNSPECIFIED SITE OF BREAST (HCC): ICD-10-CM

## 2021-03-30 DIAGNOSIS — Z80.9 FAMILY HISTORY OF CANCER: ICD-10-CM

## 2021-03-30 DIAGNOSIS — C50.911 MALIGNANT NEOPLASM OF RIGHT FEMALE BREAST, UNSPECIFIED ESTROGEN RECEPTOR STATUS, UNSPECIFIED SITE OF BREAST (HCC): Primary | ICD-10-CM

## 2021-03-30 DIAGNOSIS — Z78.0 POST-MENOPAUSAL: ICD-10-CM

## 2021-03-30 LAB
ALBUMIN SERPL-MCNC: 4.2 G/DL (ref 3.5–5.2)
ALBUMIN/GLOB SERPL: 1.4 G/DL
ALP SERPL-CCNC: 78 U/L (ref 39–117)
ALT SERPL W P-5'-P-CCNC: 11 U/L (ref 1–33)
ANION GAP SERPL CALCULATED.3IONS-SCNC: 11 MMOL/L (ref 5–15)
AST SERPL-CCNC: 13 U/L (ref 1–32)
BASOPHILS # BLD AUTO: 0.04 10*3/MM3 (ref 0–0.2)
BASOPHILS NFR BLD AUTO: 0.6 % (ref 0–1.5)
BILIRUB SERPL-MCNC: 0.3 MG/DL (ref 0–1.2)
BUN SERPL-MCNC: 14 MG/DL (ref 6–20)
BUN/CREAT SERPL: 17.7 (ref 7–25)
CALCIUM SPEC-SCNC: 9.4 MG/DL (ref 8.6–10.5)
CHLORIDE SERPL-SCNC: 105 MMOL/L (ref 98–107)
CO2 SERPL-SCNC: 25 MMOL/L (ref 22–29)
CREAT SERPL-MCNC: 0.79 MG/DL (ref 0.57–1)
DEPRECATED RDW RBC AUTO: 44.8 FL (ref 37–54)
EOSINOPHIL # BLD AUTO: 0.24 10*3/MM3 (ref 0–0.4)
EOSINOPHIL NFR BLD AUTO: 3.7 % (ref 0.3–6.2)
ERYTHROCYTE [DISTWIDTH] IN BLOOD BY AUTOMATED COUNT: 14.1 % (ref 12.3–15.4)
GFR SERPL CREATININE-BSD FRML MDRD: 75 ML/MIN/1.73
GLOBULIN UR ELPH-MCNC: 3 GM/DL
GLUCOSE SERPL-MCNC: 105 MG/DL (ref 65–99)
HCT VFR BLD AUTO: 38.1 % (ref 34–46.6)
HGB BLD-MCNC: 12.4 G/DL (ref 12–15.9)
LYMPHOCYTES # BLD AUTO: 1.82 10*3/MM3 (ref 0.7–3.1)
LYMPHOCYTES NFR BLD AUTO: 27.9 % (ref 19.6–45.3)
MCH RBC QN AUTO: 29 PG (ref 26.6–33)
MCHC RBC AUTO-ENTMCNC: 32.5 G/DL (ref 31.5–35.7)
MCV RBC AUTO: 89.2 FL (ref 79–97)
MONOCYTES # BLD AUTO: 0.45 10*3/MM3 (ref 0.1–0.9)
MONOCYTES NFR BLD AUTO: 6.9 % (ref 5–12)
NEUTROPHILS NFR BLD AUTO: 3.97 10*3/MM3 (ref 1.7–7)
NEUTROPHILS NFR BLD AUTO: 60.9 % (ref 42.7–76)
PLATELET # BLD AUTO: 296 10*3/MM3 (ref 140–450)
PMV BLD AUTO: 8.8 FL (ref 6–12)
POTASSIUM SERPL-SCNC: 3.6 MMOL/L (ref 3.5–5.2)
PROT SERPL-MCNC: 7.2 G/DL (ref 6–8.5)
RBC # BLD AUTO: 4.27 10*6/MM3 (ref 3.77–5.28)
SODIUM SERPL-SCNC: 141 MMOL/L (ref 136–145)
WBC # BLD AUTO: 6.52 10*3/MM3 (ref 3.4–10.8)

## 2021-03-30 PROCEDURE — 80053 COMPREHEN METABOLIC PANEL: CPT

## 2021-03-30 PROCEDURE — 99214 OFFICE O/P EST MOD 30 MIN: CPT | Performed by: INTERNAL MEDICINE

## 2021-03-30 PROCEDURE — 36415 COLL VENOUS BLD VENIPUNCTURE: CPT

## 2021-03-30 PROCEDURE — 85025 COMPLETE CBC W/AUTO DIFF WBC: CPT

## 2021-09-30 ENCOUNTER — TELEPHONE (OUTPATIENT)
Dept: ONCOLOGY | Facility: CLINIC | Age: 56
End: 2021-09-30

## 2021-09-30 ENCOUNTER — LAB (OUTPATIENT)
Dept: LAB | Facility: HOSPITAL | Age: 56
End: 2021-09-30

## 2021-09-30 ENCOUNTER — OFFICE VISIT (OUTPATIENT)
Dept: ONCOLOGY | Facility: CLINIC | Age: 56
End: 2021-09-30

## 2021-09-30 VITALS
HEART RATE: 85 BPM | TEMPERATURE: 97.7 F | RESPIRATION RATE: 18 BRPM | SYSTOLIC BLOOD PRESSURE: 124 MMHG | DIASTOLIC BLOOD PRESSURE: 85 MMHG | HEIGHT: 60 IN | BODY MASS INDEX: 30.04 KG/M2 | WEIGHT: 153 LBS

## 2021-09-30 DIAGNOSIS — C50.911 MALIGNANT NEOPLASM OF RIGHT FEMALE BREAST, UNSPECIFIED ESTROGEN RECEPTOR STATUS, UNSPECIFIED SITE OF BREAST (HCC): Primary | ICD-10-CM

## 2021-09-30 DIAGNOSIS — C50.911 MALIGNANT NEOPLASM OF RIGHT FEMALE BREAST, UNSPECIFIED ESTROGEN RECEPTOR STATUS, UNSPECIFIED SITE OF BREAST (HCC): ICD-10-CM

## 2021-09-30 LAB
ALBUMIN SERPL-MCNC: 4.7 G/DL (ref 3.5–5.2)
ALBUMIN/GLOB SERPL: 1.7 G/DL
ALP SERPL-CCNC: 90 U/L (ref 39–117)
ALT SERPL W P-5'-P-CCNC: 12 U/L (ref 1–33)
ANION GAP SERPL CALCULATED.3IONS-SCNC: 13 MMOL/L (ref 5–15)
AST SERPL-CCNC: 18 U/L (ref 1–32)
BASOPHILS # BLD AUTO: 0.02 10*3/MM3 (ref 0–0.2)
BASOPHILS NFR BLD AUTO: 0.3 % (ref 0–1.5)
BILIRUB SERPL-MCNC: 0.3 MG/DL (ref 0–1.2)
BUN SERPL-MCNC: 11 MG/DL (ref 6–20)
BUN/CREAT SERPL: 13.4 (ref 7–25)
CALCIUM SPEC-SCNC: 9.5 MG/DL (ref 8.6–10.5)
CHLORIDE SERPL-SCNC: 102 MMOL/L (ref 98–107)
CO2 SERPL-SCNC: 25 MMOL/L (ref 22–29)
CREAT SERPL-MCNC: 0.82 MG/DL (ref 0.57–1)
DEPRECATED RDW RBC AUTO: 42.8 FL (ref 37–54)
EOSINOPHIL # BLD AUTO: 0.22 10*3/MM3 (ref 0–0.4)
EOSINOPHIL NFR BLD AUTO: 2.8 % (ref 0.3–6.2)
ERYTHROCYTE [DISTWIDTH] IN BLOOD BY AUTOMATED COUNT: 13.1 % (ref 12.3–15.4)
GFR SERPL CREATININE-BSD FRML MDRD: 72 ML/MIN/1.73
GLOBULIN UR ELPH-MCNC: 2.8 GM/DL
GLUCOSE SERPL-MCNC: 97 MG/DL (ref 65–99)
HCT VFR BLD AUTO: 41.6 % (ref 34–46.6)
HGB BLD-MCNC: 13.7 G/DL (ref 12–15.9)
LYMPHOCYTES # BLD AUTO: 1.99 10*3/MM3 (ref 0.7–3.1)
LYMPHOCYTES NFR BLD AUTO: 25 % (ref 19.6–45.3)
MCH RBC QN AUTO: 30.3 PG (ref 26.6–33)
MCHC RBC AUTO-ENTMCNC: 32.9 G/DL (ref 31.5–35.7)
MCV RBC AUTO: 92 FL (ref 79–97)
MONOCYTES # BLD AUTO: 0.61 10*3/MM3 (ref 0.1–0.9)
MONOCYTES NFR BLD AUTO: 7.7 % (ref 5–12)
NEUTROPHILS NFR BLD AUTO: 5.12 10*3/MM3 (ref 1.7–7)
NEUTROPHILS NFR BLD AUTO: 64.2 % (ref 42.7–76)
PLATELET # BLD AUTO: 330 10*3/MM3 (ref 140–450)
PMV BLD AUTO: 9.1 FL (ref 6–12)
POTASSIUM SERPL-SCNC: 4.1 MMOL/L (ref 3.5–5.2)
PROT SERPL-MCNC: 7.5 G/DL (ref 6–8.5)
RBC # BLD AUTO: 4.52 10*6/MM3 (ref 3.77–5.28)
SODIUM SERPL-SCNC: 140 MMOL/L (ref 136–145)
WBC # BLD AUTO: 7.96 10*3/MM3 (ref 3.4–10.8)

## 2021-09-30 PROCEDURE — 80053 COMPREHEN METABOLIC PANEL: CPT | Performed by: INTERNAL MEDICINE

## 2021-09-30 PROCEDURE — 99214 OFFICE O/P EST MOD 30 MIN: CPT | Performed by: INTERNAL MEDICINE

## 2021-09-30 PROCEDURE — 85025 COMPLETE CBC W/AUTO DIFF WBC: CPT

## 2021-09-30 PROCEDURE — 36415 COLL VENOUS BLD VENIPUNCTURE: CPT | Performed by: INTERNAL MEDICINE

## 2021-09-30 RX ORDER — VALACYCLOVIR HYDROCHLORIDE 1 G/1
2000 TABLET, FILM COATED ORAL
COMMUNITY
Start: 2021-05-11

## 2021-09-30 RX ORDER — RALOXIFENE HYDROCHLORIDE 60 MG/1
60 TABLET, FILM COATED ORAL DAILY
Qty: 30 TABLET | Refills: 11 | Status: SHIPPED | OUTPATIENT
Start: 2021-09-30

## 2021-09-30 NOTE — TELEPHONE ENCOUNTER
ZEUS FROM [facility] RADIOLOGY SCHED,       CALLING ABOUT ORDER RECV FROM DR STOREY FOR GAVIN SCREENING WANTED TO GET MORE CLARIFICATION ON THE ORDER        WARM TRANSFERRED WITH MANNY ON THE CLINICAL LINE TO FURTHER ASSIST.

## 2021-09-30 NOTE — PROGRESS NOTES
Hematology/Oncology Outpatient Follow Up    PATIENT NAME:Adwoa Stacy  :1965  MRN: [id number]  PRIMARY CARE PHYSICIAN: Citlali Murphy APRN  REFERRING PHYSICIAN: Citlali Murphy APRN    Chief Complaint   Patient presents with   • Follow-up     Malignant neoplasm of right female breast, family history of cancer        HISTORY OF PRESENT ILLNESS:     This is a 54-year-old female who does not have any history of breast cancer, but who is here today due to strong family history of breast cancer mostly on the maternal side of the family.  Patient’s mother developed breast cancer.  She also has three sisters who also developed breast cancer.  None of them have had genetic testing.  Patient is interested in pursuing cancer genetics for her own risk assessment and management.  She has never had any breast biopsies.  She performs monthly breast self exams.  She denies any lumps, nipple discharge, skin discoloration.  She is scheduled for a mammogram in the next couple of weeks.   · 2019 patient had comprehensive gene analysis with cancer next technology.  A total of 67 genes were analyzed.  All negative for any significant mutations.  · 2019 patient had bilateral mammogram which showed heterogeneously dense breast tissue the lobe was recommended in 1 year  · Patient was placed on Evista for osteopenia  and breats cancer prevention   · 2020: Patient had bilateral breast MRI which was normal.    · Patient does not want to have breast MRIs for screening due to cost    Past Medical History:   Diagnosis Date   • Acid reflux disease    • Constipation    • Family history of breast cancer    • History of multiple allergies    • Hypercholesterolemia    • Osteoarthritis    • Post-menopausal        Past Surgical History:   Procedure Laterality Date   • CARPAL TUNNEL RELEASE Bilateral    •  SECTION      x2   • CYSTOSCOPY W/ LASER LITHOTRIPSY           Current Outpatient Medications:   •   "valACYclovir (VALTREX) 1000 MG tablet, Take 2,000 mg by mouth., Disp: , Rfl:   •  Calcium Carbonate-Vitamin D 500-50 MG-UNIT capsule, Take 1 tablet by mouth Daily., Disp: , Rfl:   •  Cholecalciferol (VITAMIN D) 1000 units tablet, Take 2,000 Units by mouth Daily., Disp: , Rfl:   •  escitalopram (LEXAPRO) 10 MG tablet, Take 10 mg by mouth Daily., Disp: , Rfl:   •  famotidine (PEPCID) 20 MG tablet, TAKE 1 TABLET BY MOUTH TWICE A DAY AS NEEDED FOR HEARTBURN, Disp: , Rfl:   •  gabapentin (NEURONTIN) 300 MG capsule, Take 300 mg by mouth Daily., Disp: , Rfl:   •  hydrOXYzine pamoate (VISTARIL) 25 MG capsule, TAKE 1 TO 2 CAPSULES BY MOUTH AT BEDTIME AS NEEDED, Disp: , Rfl:   •  loratadine-pseudoephedrine (CLARITIN-D 24-hour)  MG per 24 hr tablet, TAKE 1 TABLET BY MOUTH EVERY DAY, Disp: , Rfl:   •  meloxicam (MOBIC) 15 MG tablet, Take 15 mg by mouth., Disp: , Rfl:   •  omeprazole (priLOSEC) 20 MG capsule, Take 20 mg by mouth Daily., Disp: , Rfl:   •  raloxifene (EVISTA) 60 MG tablet, Take 1 tablet by mouth Daily., Disp: 30 tablet, Rfl: 11  •  rosuvastatin (CRESTOR) 10 MG tablet, Take 10 mg by mouth Daily., Disp: , Rfl: 3  •  traZODone (DESYREL) 100 MG tablet, TAKE 1/2 TO 1 TABLET BY MOUTH NIGHTLY, Disp: , Rfl:   •  TRULANCE 3 MG tablet, , Disp: , Rfl:   •  venlafaxine XR (EFFEXOR-XR) 75 MG 24 hr capsule, TAKE 1 CAPSULE BY MOUTH DAILY  DAYS., Disp: , Rfl:     Allergies   Allergen Reactions   • Sulfa Antibiotics Hives and Rash     CHEST PAIN, \"STABBING PAIN ALL THE WAY THROUGH MY BODY\"   • Penicillins Hives   • Trimethoprim Hallucinations     EYE SWELLED   • Latex Hives     On exam skin turned red       Family History   Problem Relation Age of Onset   • Breast cancer Mother 60   • Breast cancer Maternal Aunt         ages 42, 50, and 60-3 maternal aunts   • Ovarian cancer Paternal Aunt 60   • Prostate cancer Maternal Grandfather         mid 80's       Cancer-related family history includes Breast cancer in her " "maternal aunt; Breast cancer (age of onset: 60) in her mother; Ovarian cancer (age of onset: 60) in her paternal aunt; Prostate cancer in her maternal grandfather.    Social History     Tobacco Use   • Smoking status: Never Smoker   • Smokeless tobacco: Never Used   Substance Use Topics   • Alcohol use: Yes     Comment: drinks alcohol occasionally    • Drug use: No       I have reviewed and confirmed the accuracy of the patient's history: Chief complaint, HPI and ROS as entered by the MA/LPN/RN. Anabella Singh MD 09/30/21     SUBJECTIVE:    The patient is here for a follow up appointment.  Denies any lumps, nipple discharge or skin discoloration.  She remains on Evista daily.     There has not been any changes to her family cancer history          REVIEW OF SYSTEMS:  Review of Systems   Constitutional: Negative for chills and fever.   HENT: Negative for ear pain, mouth sores, nosebleeds and sore throat.    Eyes: Negative for photophobia and visual disturbance.   Respiratory: Negative for wheezing and stridor.    Cardiovascular: Negative for chest pain and palpitations.   Gastrointestinal: Negative for abdominal pain, diarrhea, nausea and vomiting.   Endocrine: Negative for cold intolerance and heat intolerance.   Genitourinary: Negative for dysuria and hematuria.   Musculoskeletal: Negative for joint swelling and neck stiffness.   Skin: Negative for color change and rash.   Neurological: Negative for seizures and syncope.   Hematological: Negative for adenopathy.        No obvious bleeding   Psychiatric/Behavioral: Negative for agitation, confusion and hallucinations.     ROS as documented    OBJECTIVE:    Vitals:    09/30/21 1055   BP: 124/85   Pulse: 85   Resp: 18   Temp: 97.7 °F (36.5 °C)   TempSrc: Infrared   Weight: 69.4 kg (153 lb)   Height: 152.4 cm (60\")   PainSc:   6   PainLoc: Comment: left hip down leg       ECOG  (0) Fully active, able to carry on all predisease performance without " restriction    Physical Exam   Constitutional: She is oriented to person, place, and time. No distress.   HENT:   Head: Normocephalic and atraumatic.   Eyes: Conjunctivae are normal. Right eye exhibits no discharge. Left eye exhibits no discharge. No scleral icterus.   Neck: No thyromegaly present.   Cardiovascular: Normal rate, regular rhythm and normal heart sounds. Exam reveals no gallop and no friction rub.   Pulmonary/Chest: Effort normal. No stridor. No respiratory distress. She has no wheezes.   Abdominal: Soft. Bowel sounds are normal. She exhibits no mass. There is no abdominal tenderness. There is no rebound and no guarding.   Musculoskeletal: Normal range of motion. No tenderness.   Lymphadenopathy:     She has no cervical adenopathy.   Neurological: She is alert and oriented to person, place, and time. She exhibits normal muscle tone.   Skin: Skin is warm. No rash noted. She is not diaphoretic. No erythema.   Psychiatric: Her behavior is normal.   Nursing note and vitals reviewed.    Bilateral breast exam was normal bilateral axillary exam was also normal     I have reexamined the patient and the results are consistent with the previously documented exam. Anabella Gloria Singh MD     RECENT LABS  WBC   Date Value Ref Range Status   09/30/2021 7.96 3.40 - 10.80 10*3/mm3 Final     RBC   Date Value Ref Range Status   09/30/2021 4.52 3.77 - 5.28 10*6/mm3 Final     Hemoglobin   Date Value Ref Range Status   09/30/2021 13.7 12.0 - 15.9 g/dL Final     Hematocrit   Date Value Ref Range Status   09/30/2021 41.6 34.0 - 46.6 % Final     MCV   Date Value Ref Range Status   09/30/2021 92.0 79.0 - 97.0 fL Final     MCH   Date Value Ref Range Status   09/30/2021 30.3 26.6 - 33.0 pg Final     MCHC   Date Value Ref Range Status   09/30/2021 32.9 31.5 - 35.7 g/dL Final     RDW   Date Value Ref Range Status   09/30/2021 13.1 12.3 - 15.4 % Final     RDW-SD   Date Value Ref Range Status   09/30/2021 42.8 37.0 - 54.0 fl  Final     MPV   Date Value Ref Range Status   09/30/2021 9.1 6.0 - 12.0 fL Final     Platelets   Date Value Ref Range Status   09/30/2021 330 140 - 450 10*3/mm3 Final     Neutrophil %   Date Value Ref Range Status   09/30/2021 64.2 42.7 - 76.0 % Final     Lymphocyte %   Date Value Ref Range Status   09/30/2021 25.0 19.6 - 45.3 % Final     Monocyte %   Date Value Ref Range Status   09/30/2021 7.7 5.0 - 12.0 % Final     Eosinophil %   Date Value Ref Range Status   09/30/2021 2.8 0.3 - 6.2 % Final     Basophil %   Date Value Ref Range Status   09/30/2021 0.3 0.0 - 1.5 % Final     Neutrophils, Absolute   Date Value Ref Range Status   09/30/2021 5.12 1.70 - 7.00 10*3/mm3 Final     Lymphocytes, Absolute   Date Value Ref Range Status   09/30/2021 1.99 0.70 - 3.10 10*3/mm3 Final     Monocytes, Absolute   Date Value Ref Range Status   09/30/2021 0.61 0.10 - 0.90 10*3/mm3 Final     Eosinophils, Absolute   Date Value Ref Range Status   09/30/2021 0.22 0.00 - 0.40 10*3/mm3 Final     Basophils, Absolute   Date Value Ref Range Status   09/30/2021 0.02 0.00 - 0.20 10*3/mm3 Final       Lab Results   Component Value Date    GLUCOSE 105 (H) 03/30/2021    BUN 14 03/30/2021    CREATININE 0.79 03/30/2021    EGFRIFNONA 75 03/30/2021    BCR 17.7 03/30/2021    K 3.6 03/30/2021    CO2 25.0 03/30/2021    CALCIUM 9.4 03/30/2021    ALBUMIN 4.20 03/30/2021    LABIL2 1.5 09/21/2020    AST 13 03/30/2021    ALT 11 03/30/2021         Assessment/Plan     Malignant neoplasm of right female breast, unspecified estrogen receptor status, unspecified site of breast (CMS/HCC)  - CBC & Differential  - Mammo Screening Digital Tomosynthesis Bilateral With CAD  - Comprehensive Metabolic Panel  - Comprehensive Metabolic Panel      ASSESSMENT:    1. Strong family history of multiple relatives with breast cancer on the maternal side of the family, including her mother and three maternal aunts, one premenopausal.  Ongoing management for high risk.   Reviewed  2. Paternal aunt also developed ovarian cancer at the age of 60 and  from metastatic disease.  No other known cancer history on the paternal side of the family.  3. Comprehensive gene analysis was negative for any significant mutation.  A total of 67 genes were analyzed all negative mutation.  4. Dense breast tissue  5. Hiral Ibarraick risk assessment estimated at 22.5% lifetime risk for developing breast cancer... elevated: Ongoing management  6. On Evista for breast cancer prevention and postmenopausal bone disease since 2019: She will complete 5 years of therapy end of .  Ongoing management  7. Monitoring for drug toxicities    PLANS:     Patient will let me know when she is ready to have breast MRI as she does not have good insurance coverage.  Bilateral screening mammogram with 3D was done on 2021 at [facility]: This was normal. Next mammogram will be due 2022.  We will schedule at this visit  Continue Evista 60 mg p.o. daily: Refilled  DEXA scan shows osteopenia  CMP today  She has been asked to continue with monthly breast self-exams and call for lumps, nipple discharge, skin discoloration or breast pain.  Reviewed  Continue calcium twice a day  Follow up in 6 months or earlier as needed for problems             I have reviewed labs results, imaging, vitals, and medications with the patient today. Will follow up in 6 months with me.    Patient verbalized understanding and is in agreement of the above plan.

## 2022-03-08 ENCOUNTER — TELEPHONE (OUTPATIENT)
Dept: ONCOLOGY | Facility: CLINIC | Age: 57
End: 2022-03-08

## 2022-03-08 NOTE — TELEPHONE ENCOUNTER
Left v/m asking Pt to call to r/s appt on April 4th w/Samantha. Provider will not be in the office. Contact either Kellen or myself

## 2022-04-04 ENCOUNTER — APPOINTMENT (OUTPATIENT)
Dept: LAB | Facility: HOSPITAL | Age: 57
End: 2022-04-04

## 2022-09-19 ENCOUNTER — OFFICE (OUTPATIENT)
Dept: URBAN - METROPOLITAN AREA CLINIC 64 | Facility: CLINIC | Age: 57
End: 2022-09-19

## 2022-09-19 VITALS
DIASTOLIC BLOOD PRESSURE: 90 MMHG | SYSTOLIC BLOOD PRESSURE: 132 MMHG | HEART RATE: 95 BPM | WEIGHT: 148 LBS | HEIGHT: 61 IN

## 2022-09-19 DIAGNOSIS — K59.00 CONSTIPATION, UNSPECIFIED: ICD-10-CM

## 2022-09-19 PROCEDURE — 99204 OFFICE O/P NEW MOD 45 MIN: CPT | Performed by: INTERNAL MEDICINE

## 2022-09-19 RX ORDER — FAMOTIDINE 40 MG/1
TABLET, FILM COATED ORAL
Qty: 60 | Refills: 12 | Status: ACTIVE

## 2022-11-09 ENCOUNTER — ON CAMPUS - OUTPATIENT (OUTPATIENT)
Dept: URBAN - METROPOLITAN AREA HOSPITAL 77 | Facility: HOSPITAL | Age: 57
End: 2022-11-09

## 2022-11-09 DIAGNOSIS — K63.5 POLYP OF COLON: ICD-10-CM

## 2022-11-09 DIAGNOSIS — Z86.010 PERSONAL HISTORY OF COLONIC POLYPS: ICD-10-CM

## 2022-11-09 DIAGNOSIS — K64.0 FIRST DEGREE HEMORRHOIDS: ICD-10-CM

## 2022-11-09 DIAGNOSIS — Z12.11 ENCOUNTER FOR SCREENING FOR MALIGNANT NEOPLASM OF COLON: ICD-10-CM

## 2022-11-09 PROCEDURE — 45385 COLONOSCOPY W/LESION REMOVAL: CPT | Mod: 33 | Performed by: INTERNAL MEDICINE
